# Patient Record
Sex: FEMALE | Race: WHITE | Employment: FULL TIME | URBAN - METROPOLITAN AREA
[De-identification: names, ages, dates, MRNs, and addresses within clinical notes are randomized per-mention and may not be internally consistent; named-entity substitution may affect disease eponyms.]

---

## 2019-03-19 ENCOUNTER — APPOINTMENT (OUTPATIENT)
Dept: PHYSICAL THERAPY | Age: 39
End: 2019-03-19
Payer: OTHER MISCELLANEOUS

## 2019-03-26 ENCOUNTER — APPOINTMENT (OUTPATIENT)
Dept: PHYSICAL THERAPY | Age: 39
End: 2019-03-26
Payer: OTHER MISCELLANEOUS

## 2019-04-01 ENCOUNTER — HOSPITAL ENCOUNTER (OUTPATIENT)
Dept: PHYSICAL THERAPY | Age: 39
Discharge: HOME | End: 2019-04-01
Payer: OTHER MISCELLANEOUS

## 2019-04-01 PROCEDURE — 97161 PT EVAL LOW COMPLEX 20 MIN: CPT

## 2019-04-01 PROCEDURE — 97530 THERAPEUTIC ACTIVITIES: CPT

## 2019-04-01 NOTE — PROGRESS NOTES
PHYSICAL THERAPY - DAILY TREATMENT NOTE Patient Name: Ama An        Date: 2019 : 1980   YES Patient  Verified Visit #:     Insurance: Payor: Ezequiel Hernandez / Plan: [employer] / Product Type: Commerical / In time: 2:00P Out time: 2:50P Total Treatment Time: 50 BCBS/Medicare Time Tracking (below) Total Timed Codes (min):  50 1:1 Treatment Time:  50 TREATMENT AREA =  Low back pain [M54.5] SUBJECTIVE Pain Level (on 0 to 10 scale):  4-6  / 10 Medication Changes/New allergies or changes in medical history, any new surgeries or procedures? NO    If yes, update Summary List  
Subjective Functional Status/Changes:  []  No changes reported See POC Modalities Rationale:     decrease inflammation and decrease pain to improve patient's ability to perform unlimited transfers 
 min [] Estim, type/location:   
                                 []  att     []  unatt     []  w/US     []  w/ice    []  w/heat 
 min []  Mechanical Traction: type/lbs   
               []  pro   []  sup   []  int   []  cont    []  before manual    []  after manual  
 min []  Ultrasound, settings/location:    
 min []  Iontophoresis w/ dexamethasone, location:   
                                           []  take home patch       []  in clinic  
10 min [x]  Ice     []  Heat    location/position: L/s in sitting   
 min []  Vasopneumatic Device, press/temp:   
 min []  Other:   
[] Skin assessment post-treatment (if applicable):   
[]  intact    []  redness- no adverse reaction    
[]redness  adverse reaction:     10 min Therapeutic Activity: [x]  See flow sheet; review of log roll, review of proper sitting posture, review of l/s and thoracic posture prior to standing from sit to stand to decrease overall pain.    
Rationale:    increase ROM, increase strength, improve coordination, improve balance and increase proprioception to improve the patients ability to perform unlimited transfers. Billed With/As: 
 [] TE 
 [] TA 
 [] Neuro 
 [] Self Care Patient Education: [x] Review HEP [] Progressed/Changed HEP based on:  
[] positioning   [] body mechanics   [] transfers   [] heat/ice application   
[] other:   
Other Objective/Functional Measures: 
 
Reviewed work ergonomics Post Treatment Pain Level (on 0 to 10) scale:   3-5  / 10 ASSESSMENT Assessment/Changes in Function:  
 
See POC []  See Progress Note/Recertification Patient will continue to benefit from skilled PT services to modify and progress therapeutic interventions, address functional mobility deficits, address ROM deficits, address strength deficits, analyze and address soft tissue restrictions, analyze and cue movement patterns, analyze and modify body mechanics/ergonomics, assess and modify postural abnormalities, address imbalance/dizziness and instruct in home and community integration to attain remaining goals. Progress toward goals / Updated goals: 
See POC PLAN [x]  Upgrade activities as tolerated YES Continue plan of care  
[]  Discharge due to :   
[]  Other:   
 
Therapist: Alexia Lopez Date: 4/1/2019 Time: 3:04 PM  
 
Future Appointments Date Time Provider Ava Burr 4/5/2019  2:30 PM [facility]  
4/9/2019  2:30 PM [facility]  
4/16/2019  2:00 PM [place] [facility] [place]  
4/18/2019  2:30 [place] [facility] [place]

## 2019-04-01 NOTE — PROGRESS NOTES
[address] [facility]  [facility] 
Kirk Iyer [facility] [address], [address] ,[address], [address]  Phone: [phone]  Fax: [phone] PLAN OF CARE / STATEMENT OF MEDICAL NECESSITY FOR PHYSICAL THERAPY SERVICES Patient Name: Noel Kramer : 1980 Medical  
Diagnosis: LBP Treatment Diagnosis: Low back pain [M54.5] Onset Date: 19 Referral Source: Edison Kingston MD [facility]): 2019 Prior Hospitalization: See medical history Provider #: [id number] Prior Level of Function: Unlimited sitting, squatting, standing, ambulation, and transfers without pain. Comorbidities: h/o concussion, Medications: Verified on Patient Summary List  
The Plan of Care and following information is based on the information from the initial evaluation.  
=========================================================================================== Assessment / key information:  PT is a 41year old female [facility] with a dx of T12 compression fracture on 19. She reports falling onto her L side when walking out of her work building and tripping on the sidewalk and fell onto her L side in a seated position. She was able to get up and walk to her car but then 2 hours later had trouble breathing and increased pain throughout her back. She reports radiographs were (+) for a compression fracture on T12 and she has since been wearing a TLSO since 19 and is weaning out of the brace per MD instructions given to her on 3/21/19. Pt takes pain medication 4x a week only at night. Patient reports pain is intermittent and made worse with movements. She reports having pin point pain that radiates throughout lumbar and thoracic paraspinals. Objective evaluation is as follows: 1) increased forward lean and lumbar/thoracic flexion in sitting resulting in painful sit to stand transfers with difficulty achieving full extension into standing.   Supine to sit transfers with increased pain. Poor tolerance to supine ~ 5 minutes until pain throughout lumbar/thoracic is increased. 2) Poor core strength in hooklying, hip abduction 3+/5 bilaterally, hip adduction 4-/5 bilaterally, hip flexion 4-/5, unable to perform double leg squat without pain. 3) TTP along lumbar and thoracic paraspinals 4) hip flexion AROM is limited to 90 degrees bilaterally, PROM is limited to 110 degrees bilaterally with end range tightness, notable decrease in hamstring muscle length. Pt signs and sx are consistent with LBP s/p T12 compression fracture. Pt would benefit from skilled PT services to increase strength, range of motion, balance, proprioception, coordination, core stability in order to improve ease with ADLs and functional mobility.    
=========================================================================================== Eval Complexity: History MEDIUM  Complexity : 1-2 comorbidities / personal factors will impact the outcome/ POC ;  Examination  MEDIUM Complexity : 3 Standardized tests and measures addressing body structure, function, activity limitation and / or participation in recreation ; Presentation LOW Complexity : Stable, uncomplicated ;  Decision Making HIGH Complexity : FOTO score of 1- 25 ; Overall Complexity LOW Problem List: pain affecting function, decrease ROM, decrease strength, edema affecting function, impaired gait/ balance, decrease ADL/ functional abilitiies, decrease activity tolerance, decrease flexibility/ joint mobility and decrease transfer abilities Treatment Plan may include any combination of the following: Therapeutic exercise, Therapeutic activities, Neuromuscular re-education, Physical agent/modality, Gait/balance training, Manual therapy, Aquatic therapy, Patient education, Self Care training, Functional mobility training, Home safety training and Stair training Patient / Family readiness to learn indicated by: asking questions, trying to perform skills and interest 
Persons(s) to be included in education: patient (P) Barriers to Learning/Limitations: None Measures taken, if barriers to learning:   
Patient Goal (s): \"I want to be able to function normally again. Get back to working out\" Patient self reported health status: fair Rehabilitation Potential: good ? Short Term Goals: To be accomplished in  4  weeks: 
1) etablish HEP in order to maintain gains made in physical therapy. 2) Patient to tolerate supine laying for > 10 minutes in order ot improve sleep quality. 3) Patient to have fair core recruitment in hooklying in order to allow for ease with transfers. 4) patient to make changes to work station to improve ergonomics to prevent further disability. ? Long Term Goals: To be accomplished in  6  weeks: 
1) Patient will be I and compliant with a progressive, high level HEP in order to maintain gains made in physical therapy. 2) Patient will demonstrate good core recruitment in sitting in order ot perform high level exercises. 3) Patient will be able to lift light objects from various heights with good body mechanics in order to prevent further disability. 4) Patient will increase FOTO score to >/= 47 in order to allow for ease with broom use. Frequency / Duration:   Patient to be seen  2-3  times per week for 6  weeks: 
Patient / Caregiver education and instruction: self care, activity modification, brace/ splint application and exercises Therapist Signature: Eamon Morris PT DPT  Date: 4/1/2019 Certification Period: NA Time: 1:57 PM  
=========================================================================================== I certify that the above Physical Therapy Services are being furnished while the patient is under my care. I agree with the treatment plan and certify that this therapy is necessary. Physician Signature:       Date:      Time:  Please sign and return to [facility] [employer] or you may fax the signed copy to [phone]. Thank you.

## 2019-04-05 ENCOUNTER — HOSPITAL ENCOUNTER (OUTPATIENT)
Dept: PHYSICAL THERAPY | Age: 39
Discharge: HOME | End: 2019-04-05
Payer: OTHER MISCELLANEOUS

## 2019-04-05 PROCEDURE — 97110 THERAPEUTIC EXERCISES: CPT

## 2019-04-05 NOTE — PROGRESS NOTES
PHYSICAL THERAPY - DAILY TREATMENT NOTE Patient Name: Aiden Reich        Date: 2019 : 1980   YES Patient  Verified Visit #:      of   12  Insurance: Payor: Salina Aaron / Plan: Diego Newberry / Product Type: Commerical / In time: 2;35P Out time: 3;20P Total Treatment Time: 45  
 
BCBS/Medicare Time Tracking (below) Total Timed Codes (min):  NA 1:1 Treatment Time:  NA  
TREATMENT AREA =  Low back pain [M54.5] SUBJECTIVE Pain Level (on 0 to 10 scale):  5  / 10 Medication Changes/New allergies or changes in medical history, any new surgeries or procedures? NO    If yes, update Summary List  
Subjective Functional Status/Changes:  []  No changes reported \"Today is a bad day\"  Pt reports increased pain throughout her back, possibly due to stressful day. Modalities Rationale:     decrease pain to improve patient's ability to perform unlimited transfers  
 min [] Estim, type/location:   
                                 []  att     []  unatt     []  w/US     []  w/ice    []  w/heat 
 min []  Mechanical Traction: type/lbs   
               []  pro   []  sup   []  int   []  cont    []  before manual    []  after manual  
 min []  Ultrasound, settings/location:    
 min []  Iontophoresis w/ dexamethasone, location:   
                                           []  take home patch       []  in clinic  
10 min [x]  Ice     []  Heat    location/position: Thoracic/lumbar spine ins sitting.   
 min []  Vasopneumatic Device, press/temp:   
 min []  Other:   
[] Skin assessment post-treatment (if applicable):   
[]  intact    []  redness- no adverse reaction    
[]redness  adverse reaction:     
35 min Therapeutic Exercise:  [x]  See flow sheet Rationale:      increase ROM, increase strength, improve coordination, improve balance and increase proprioception to improve the patients ability to perform unlimited ADls Billed With/As: 
 [] TE 
 [] TA 
 [] Neuro [] Self Care Patient Education: [x] Review HEP [] Progressed/Changed HEP based on:  
[] positioning   [] body mechanics   [] transfers   [] heat/ice application   
[] other:   
Other Objective/Functional Measures: 
 
Limited tolerance to supine laying today 
initated therex per flow sheet Educated patient on use of cushioned insole to decrease shock/pain throughout l/s and t/s during ambulation when wearing flat/dressy shoes. Post Treatment Pain Level (on 0 to 10) scale:   5  / 10 ASSESSMENT Assessment/Changes in Function:  
 
Patient is making gradual progress with PT rx, slight improvement in tolerance to supine laying today but limited to 3 exercises. []  See Progress Note/Recertification Patient will continue to benefit from skilled PT services to modify and progress therapeutic interventions, address functional mobility deficits, address ROM deficits, address strength deficits, analyze and address soft tissue restrictions, analyze and cue movement patterns, analyze and modify body mechanics/ergonomics, assess and modify postural abnormalities, address imbalance/dizziness and instruct in home and community integration to attain remaining goals. Progress toward goals / Updated goals: 
Progressing towards LTG 2. PLAN [x]  Upgrade activities as tolerated YES Continue plan of care  
[]  Discharge due to :   
[]  Other:   
 
Therapist: William Mcdonnell Date: 4/5/2019 Time: 3:58 PM  
 
Future Appointments Date Time Provider Ava Burr 4/9/2019  2:30 PM [facility]  
4/16/2019  2:00 PM Mariaa Mcqueen PT [facility] [place]  
4/18/2019  2:30 [place] [facility] [place]

## 2019-04-09 ENCOUNTER — HOSPITAL ENCOUNTER (OUTPATIENT)
Dept: PHYSICAL THERAPY | Age: 39
Discharge: HOME | End: 2019-04-09
Payer: OTHER MISCELLANEOUS

## 2019-04-09 PROCEDURE — 97110 THERAPEUTIC EXERCISES: CPT

## 2019-04-09 NOTE — PROGRESS NOTES
PHYSICAL THERAPY - DAILY TREATMENT NOTE Patient Name: Shira Barton        Date: 2019 :    YES Patient  Verified Visit #:   3   of   12  Insurance: Payor: Gonzales Gunn / Plan: Garrison Gardner / Product Type: Commerical / In time: 2:20P Out time: 3:20P Total Treatment Time: 60  
 
BCBS/Medicare Time Tracking (below) Total Timed Codes (min):  NA 1:1 Treatment Time:  NA  
TREATMENT AREA =  Low back pain [M54.5] SUBJECTIVE Pain Level (on 0 to 10 scale):  4.5  / 10 Medication Changes/New allergies or changes in medical history, any new surgeries or procedures? NO    If yes, update Summary List  
Subjective Functional Status/Changes:  []  No changes reported \"I tried sheos with a insole but they were uncomfortable on my feet\"  Patient reports f/u with MD and she reports that her back is still not healing and that she continues to be on restrictions at work and does not need to wear her brace anymore. Modalities Rationale:     decrease pain to improve patient's ability to perform pain free transfers 
 min [] Estim, type/location:   
                                 []  att     []  unatt     []  w/US     []  w/ice    []  w/heat 
 min []  Mechanical Traction: type/lbs   
               []  pro   []  sup   []  int   []  cont    []  before manual    []  after manual  
 min []  Ultrasound, settings/location:    
 min []  Iontophoresis w/ dexamethasone, location:   
                                           []  take home patch       []  in clinic  
10 min [x]  Ice     []  Heat    location/position: L/s in sitting   
 min []  Vasopneumatic Device, press/temp:   
 min []  Other:   
[] Skin assessment post-treatment (if applicable):   
[]  intact    []  redness- no adverse reaction    
[]redness  adverse reaction:     
50 min Therapeutic Exercise:  [x]  See flow sheet Rationale:      increase ROM, increase strength, improve coordination, improve balance and increase proprioception to improve the patients ability to perform unlimited ADLs Billed With/As: 
 [] TE 
 [] TA 
 [] Neuro 
 [] Self Care Patient Education: [x] Review HEP [] Progressed/Changed HEP based on:  
[] positioning   [] body mechanics   [] transfers   [] heat/ice application   
[] other:   
Other Objective/Functional Measures: Added SB iso, clamshell. Improved gait mechanics and tolerance to supine laying today. Poor core recruitment during SB march Post Treatment Pain Level (on 0 to 10) scale:   2  / 10 ASSESSMENT Assessment/Changes in Function:  
 
Patient is making gradual progress with PT rx but continues to require max cuing throughout core exercise. []  See Progress Note/Recertification Patient will continue to benefit from skilled PT services to modify and progress therapeutic interventions, address functional mobility deficits, address ROM deficits, address strength deficits, analyze and address soft tissue restrictions, analyze and cue movement patterns, analyze and modify body mechanics/ergonomics, assess and modify postural abnormalities, address imbalance/dizziness and instruct in home and community integration to attain remaining goals. Progress toward goals / Updated goals: 
Progressing towards STG 2. PLAN [x]  Upgrade activities as tolerated YES Continue plan of care  
[]  Discharge due to :   
[]  Other:   
 
Therapist: Alexia Lopez Date: 4/9/2019 Time: 2:25 PM  
 
Future Appointments Date Time Provider Ava Burr 4/9/2019  2:30 PM Adam Ronquillo [facility]  
4/16/2019  2:00 PM [place] [facility] [place]  
4/18/2019  2:30 PM [place] [facility] [place]

## 2019-04-12 ENCOUNTER — APPOINTMENT (OUTPATIENT)
Dept: PHYSICAL THERAPY | Age: 39
End: 2019-04-12
Payer: OTHER MISCELLANEOUS

## 2019-04-16 ENCOUNTER — HOSPITAL ENCOUNTER (OUTPATIENT)
Dept: PHYSICAL THERAPY | Age: 39
Discharge: HOME | End: 2019-04-16
Payer: OTHER MISCELLANEOUS

## 2019-04-16 PROCEDURE — 97110 THERAPEUTIC EXERCISES: CPT

## 2019-04-16 NOTE — PROGRESS NOTES
PHYSICAL THERAPY - DAILY TREATMENT NOTE Patient Name: Gabriella Atkins        Date: 2019 : 1980   YES Patient  Verified Visit #:    Insurance: Payor: Johanne Jaffe / Plan: Estevan Spencer / Product Type: Commerical / In time: NA Out time: NA Total Treatment Time: NA Medicare Time Tracking (below) Total Timed Codes (min):   1:1 Treatment Time:    
TREATMENT AREA =  Low back pain [M54.5] SUBJECTIVE Pain Level (on 0 to 10 scale):    / 10 Medication Changes/New allergies or changes in medical history, any new surgeries or procedures? NO    If yes, update Summary List  
Subjective Functional Status/Changes:  []  No changes reported See paper chart (CC system down on this date) Modalities Rationale:    
 min [] Estim, type/location:   
                                 []  att     []  unatt     []  w/US     []  w/ice    []  w/heat 
 min []  Mechanical Traction: type/lbs   
               []  pro   []  sup   []  int   []  cont    []  before manual    []  after manual  
 min []  Ultrasound, settings/location:    
 min []  Iontophoresis w/ dexamethasone, location:   
                                           []  take home patch       []  in clinic  
 min []  Ice     []  Heat    location/position:   
 min []  Vasopneumatic Device, press/temp:   
 min []  Other:   
[] Skin assessment post-treatment (if applicable):   
[]  intact    []  redness- no adverse reaction    
[]redness  adverse reaction:     
 min Therapeutic Exercise:  [x]  See flow sheet Rationale:     
 min Manual Therapy:   
Rationale:      min Therapeutic Activity: [x]  See flow sheet Rationale:    
 
 min Neuromuscular Re-ed: [x]  See flow sheet Rationale:     
 
 min Gait Training:  ___ feet with ___ device on level surfaces with ___ level of assistance Rationale:  To improve ambulation safety and efficiency in order to improve patient's ability to safely ambulate at home for self care. min Self Care:   
Rationale:    
Billed With/As: 
 [] TE 
 [] TA 
 [] Neuro 
 [] Self Care Patient Education: [x] Review HEP [] Progressed/Changed HEP based on:  
[] positioning   [] body mechanics   [] transfers   [] heat/ice application   
[] other:   
 
Other Objective/Functional Measures: 
 
See paper chart (CC system down on this date) Post Treatment Pain Level (on 0 to 10) scale:     / 10 ASSESSMENT Assessment/Changes in Function:  
 
See paper chart (CC system down on this date) []  See Progress Note/Recertification Patient will continue to benefit from skilled PT services to modify and progress therapeutic interventions to attain remaining goals. Progress toward goals / Updated goals: 
See paper chart (CC system down on this date) PLAN [x]  Upgrade activities as tolerated YES Continue plan of care  
[]  Discharge due to :   
[]  Other:   
 
Therapist: Kit Lozano, PT Date: 4/16/2019 Time: 6:05 PM  
 
Future Appointments Date Time Provider Aav Burr 4/18/2019  2:30 PM [facility]

## 2019-04-18 ENCOUNTER — HOSPITAL ENCOUNTER (OUTPATIENT)
Dept: PHYSICAL THERAPY | Age: 39
Discharge: HOME | End: 2019-04-18
Payer: OTHER MISCELLANEOUS

## 2019-04-18 PROCEDURE — 97110 THERAPEUTIC EXERCISES: CPT

## 2019-04-18 NOTE — PROGRESS NOTES
PHYSICAL THERAPY - DAILY TREATMENT NOTE Patient Name: Ama An        Date: 2019 : 1980   YES Patient  Verified Visit #:      of   12  Insurance: Payor: Ezequiel Hernandez / Plan: [employer] / Product Type: Commerical / In time: 2:30P Out time: 3:27P Total Treatment Time: 62 BCBS/Medicare Time Tracking (below) Total Timed Codes (min):  na 1:1 Treatment Time:  na  
TREATMENT AREA =  Low back pain [M54.5] SUBJECTIVE Pain Level (on 0 to 10 scale):  3  / 10 Medication Changes/New allergies or changes in medical history, any new surgeries or procedures? NO    If yes, update Summary List  
Subjective Functional Status/Changes:  []  No changes reported Pt repots going to [place] this past weekend with no increase in overall pain. She reports improvements in overall sleep quality requiring less positioning with pillows. Modalities Rationale:     decrease inflammation and decrease pain to improve patient's ability to perform unlimited ADLs 
 min [] Estim, type/location:   
                                 []  att     []  unatt     []  w/US     []  w/ice    []  w/heat 
 min []  Mechanical Traction: type/lbs   
               []  pro   []  sup   []  int   []  cont    []  before manual    []  after manual  
 min []  Ultrasound, settings/location:    
 min []  Iontophoresis w/ dexamethasone, location:   
                                           []  take home patch       []  in clinic  
10 min [x]  Ice     []  Heat    location/position: Thoracic spine in sitting.   
 min []  Vasopneumatic Device, press/temp:   
 min []  Other:   
[] Skin assessment post-treatment (if applicable):   
[]  intact    []  redness- no adverse reaction    
[]redness  adverse reaction:     
47 min Therapeutic Exercise:  [x]  See flow sheet Rationale:      increase ROM, increase strength, improve coordination, improve balance and increase proprioception to improve the patients ability to perform unlimited ADLs Billed With/As: 
 [] TE 
 [] TA 
 [] Neuro 
 [] Self Care Patient Education: [x] Review HEP [] Progressed/Changed HEP based on:  
[] positioning   [] body mechanics   [] transfers   [] heat/ice application   
[] other:   
Other Objective/Functional Measures: 
 
Notable improvement in ability to tolerate supine and no visual signs of pain during bed mobility today. Able to perform quadruped TA draw and h/l march with TA draw today Post Treatment Pain Level (on 0 to 10) scale:   1  / 10 ASSESSMENT Assessment/Changes in Function: No overall increase in pain with therex today, requires moderate cuing throughout all core exercises for TA activation. []  See Progress Note/Recertification Patient will continue to benefit from skilled PT services to modify and progress therapeutic interventions, address functional mobility deficits, address ROM deficits, address strength deficits, analyze and address soft tissue restrictions, analyze and cue movement patterns, analyze and modify body mechanics/ergonomics, assess and modify postural abnormalities, address imbalance/dizziness and instruct in home and community integration to attain remaining goals. Progress toward goals / Updated goals: 
Progressing towards STG 2. PLAN [x]  Upgrade activities as tolerated YES Continue plan of care  
[]  Discharge due to :   
[]  Other:   
 
Therapist: Daniel Levy Date: 4/18/2019 Time: 2:39 PM  
 
Future Appointments Date Time Provider Ava Burr 4/23/2019  2:30 PM Jenaro Chapin [place] [id number]  
4/25/2019  2:30 PM Jenaro Chapin [place] [id number]  
5/2/2019  2:00 PM Jenaro Chapin [place] [id number]  
5/3/2019  2:00 PM  [address]

## 2019-04-23 ENCOUNTER — APPOINTMENT (OUTPATIENT)
Dept: PHYSICAL THERAPY | Age: 39
End: 2019-04-23
Payer: OTHER MISCELLANEOUS

## 2019-04-25 ENCOUNTER — APPOINTMENT (OUTPATIENT)
Dept: PHYSICAL THERAPY | Age: 39
End: 2019-04-25
Payer: OTHER MISCELLANEOUS

## 2019-05-02 ENCOUNTER — HOSPITAL ENCOUNTER (OUTPATIENT)
Dept: PHYSICAL THERAPY | Age: 39
Discharge: HOME | End: 2019-05-02
Payer: OTHER MISCELLANEOUS

## 2019-05-02 PROCEDURE — 97110 THERAPEUTIC EXERCISES: CPT

## 2019-05-02 NOTE — PROGRESS NOTES
[address]  [facility] PHYSICAL THERAPY AT [address] [place] [address] [facility], [address] [place] [address], [address]  Phone: [phone]  Fax: [phone] PROGRESS NOTE Patient Name: Frederick Bustillos : 1980 Treatment/Medical Diagnosis: Low back pain [M54.5] Referral Source: Donald Loaiza MD    
Date of Initial Visit: 19 Attended Visits: 6 Missed Visits: 2 SUMMARY OF TREATMENT Therapeutic exercise to increase strength, range of motion, balance, proprioception, coordination, core stability, and back education. Modalities as needed for pain control. CURRENT STATUS Ms. Vega is making gradual progress with physical therapy. She reports feeling approximately 20% improvement overall in her sx. Mopping, sweeping, unable to lift her son > 52 pounds, only able to sit for approximately 1 hour until pain begins. Pain ranges from a 2-4/10. She reports occasional use of TLSO when performing house work. She has made some modifications to her work station including use of a lumbar roll. She currently is able to sleep throughout the night with waking 1-2x per night. She has stopped taking all pain medication but will take ibuprofen at night time for sleeping. Goal/Measure of Progress Goal Met? 1.  etablish HEP in order to maintain gains made in physical therapy. Status at last Eval: - Current Status: Established  yes 2. Patient to tolerate supine laying for > 10 minutes in order ot improve sleep quality. Status at last Eval: Unable to tolerate 5 minutes in supine Current Status: Tolerates 16 minutes of supine therex. yes 3. Patient to have fair core recruitment in hooklying in order to allow for ease with transfers. Status at last Eval: poor Current Status: fair yes 4.  patient to make changes to work station to improve ergonomics to prevent further disability.   
Status at last Eval: Reports sitting with forward flexed trunk for comfort Current Status: Come compliance with use of lumbar roll yes New Goals to be achieved in __4__  weeks: 1. Patient will be I and compliant with a progressive, high level HEP in order to maintain gains made in physical therapy. 2.  Patient will demonstrate good core recruitment in sitting in order ot perform high level exercises. 3.  Patient will be able to lift light objects from various heights with good body mechanics in order to prevent further disability. 4.  Patient will increase FOTO score to >/= 47 in order to allow for ease with broom use. RECOMMENDATIONS Continue PT rx 2x a week for 4 weeks to work towards LTG. If you have any questions/comments please contact us directly at [phone]. Thank you for allowing us to assist in the care of your patient. Therapist Signature: Lucina Soares Date: 5/2/2019 Time: 2:00 PM  
NOTE TO PHYSICIAN:  PLEASE COMPLETE THE ORDERS BELOW AND FAX TO [facility] Physical Therapy: [phone]. If you are unable to process this request in 24 hours please contact our office: [phone]. 
 
___ I have read the above report and request that my patient continue as recommended.  
___ I have read the above report and request that my patient continue therapy with the following changes/special instructions:_________________________________________________________  
___ I have read the above report and request that my patient be discharged from therapy.   
 
Physician Signature:       Date:      Time:

## 2019-05-02 NOTE — PROGRESS NOTES
PHYSICAL THERAPY - DAILY TREATMENT NOTE Patient Name: Desean Reed        Date: 2019 : 1980   YES Patient  Verified Visit #:     Insurance: Payor: Ez Mcmillan / Plan: Mirta Isaacs / Product Type: Commerical / In time: 2:00P Out time: 2:55P Total Treatment Time: 55  
 
BCBS/Medicare Time Tracking (below) Total Timed Codes (min):  NA 1:1 Treatment Time:  NA  
TREATMENT AREA =  Low back pain [M54.5] SUBJECTIVE Pain Level (on 0 to 10 scale):  2  / 10 Medication Changes/New allergies or changes in medical history, any new surgeries or procedures? NO    If yes, update Summary List  
Subjective Functional Status/Changes:  []  No changes reported \"I am so frustated I haven't felt any better with my pain\" Modalities Rationale:     decrease inflammation and decrease pain to improve patient's ability to perform 
 min [] Estim, type/location:   
                                 []  att     []  unatt     []  w/US     []  w/ice    []  w/heat 
 min []  Mechanical Traction: type/lbs   
               []  pro   []  sup   []  int   []  cont    []  before manual    []  after manual  
 min []  Ultrasound, settings/location:    
 min []  Iontophoresis w/ dexamethasone, location:   
                                           []  take home patch       []  in clinic  
10 min [x]  Ice     []  Heat    location/position: T/s in supine  
 min []  Vasopneumatic Device, press/temp:   
 min []  Other:   
[] Skin assessment post-treatment (if applicable):   
[]  intact    []  redness- no adverse reaction    
[]redness  adverse reaction:     
45 min Therapeutic Exercise:  [x]  See flow sheet Rationale:      increase ROM, increase strength, improve coordination, improve balance and increase proprioception to improve the patients ability to perform unlimited ADLs Billed With/As: 
 [] TE 
 [] TA 
 [] Neuro 
 [] Self Care Patient Education: [x] Review HEP   
 [] Progressed/Changed HEP based on:  
[] positioning   [] body mechanics   [] transfers   [] heat/ice application   
[] other:   
Other Objective/Functional Measures: 
 
See PN Post Treatment Pain Level (on 0 to 10) scale:   2  / 10 ASSESSMENT Assessment/Changes in Function:  
 
See PN  
  
[]  See Progress Note/Recertification Patient will continue to benefit from skilled PT services to modify and progress therapeutic interventions, address functional mobility deficits, address ROM deficits, address strength deficits, analyze and address soft tissue restrictions, analyze and cue movement patterns, analyze and modify body mechanics/ergonomics, assess and modify postural abnormalities, address imbalance/dizziness and instruct in home and community integration to attain remaining goals. Progress toward goals / Updated goals: 
See PN   
 
PLAN [x]  Upgrade activities as tolerated YES Continue plan of care  
[]  Discharge due to :   
[]  Other:   
 
Therapist: Rashmi Farias Date: 5/2/2019 Time: 2:25 PM  
 
Future Appointments Date Time Provider Ava Burr 5/3/2019  2:00 PM [facility]

## 2019-05-07 ENCOUNTER — HOSPITAL ENCOUNTER (OUTPATIENT)
Dept: PHYSICAL THERAPY | Age: 39
Discharge: HOME | End: 2019-05-07
Payer: OTHER MISCELLANEOUS

## 2019-05-07 PROCEDURE — 97110 THERAPEUTIC EXERCISES: CPT

## 2019-05-07 NOTE — PROGRESS NOTES
PHYSICAL THERAPY - DAILY TREATMENT NOTE Patient Name: Kavita Razo        Date: 2019 : 1980   YES Patient  Verified Visit #:     Insurance: Payor: Bimal Bloom / Plan: Javier Healy / Product Type: Commerical / In time: 2 P Out time: 2:50 P Total Treatment Time: 50 BCBS/Medicare Time Tracking (below) Total Timed Codes (min):  NA 1:1 Treatment Time:  NA  
TREATMENT AREA = Low back pain [M54.5] SUBJECTIVE Pain Level (on 0 to 10 scale):  2  / 10 Medication Changes/New allergies or changes in medical history, any new surgeries or procedures? NO    If yes, update Summary List  
Subjective Functional Status/Changes:  []  No changes reported Patient reports since last Friday, she has started to have pain again with sneezing or anything that causes pressure, like laughing or coughing but she is also more active & more on her feet. She feels like she has plateaued with progress, she is no better & no worse, she is taking ibuprofen to sleep at night. Constant pain with average pain at 2-3/10, 7-8/10 at worst typically with bending forward or sitting too long or if she has stress at work. 20% improvement overall. She only uses back brace when she does housework & still waiting on rising work station at work. Modalities Rationale:     decrease pain to improve patient's ability to return to pain-free work activities 
 min [] Estim, type/location:   
                                 []  att     []  unatt     []  w/US     []  w/ice    []  w/heat 
 min []  Mechanical Traction: type/lbs   
               []  pro   []  sup   []  int   []  cont    []  before manual    []  after manual  
 min []  Ultrasound, settings/location:    
 min []  Iontophoresis w/ dexamethasone, location:   
                                           []  take home patch       []  in clinic  
10 min [x]  Ice     []  Heat    location/position: Seated to t/s min []  Vasopneumatic Device, press/temp:   
 min []  Other:   
[] Skin assessment post-treatment (if applicable):   
[]  intact    []  redness- no adverse reaction    
[]redness  adverse reaction:     
40 min Therapeutic Exercise:  [x]  See flow sheet Rationale:      increase ROM and increase strength to improve the patients ability to return to light lifting Billed With/As: 
 [] TE 
 [] TA 
 [] Neuro 
 [] Self Care Patient Education: [x] Review HEP [] Progressed/Changed HEP based on:  
[] positioning   [] body mechanics   [] transfers   [] heat/ice application   
[] other:   
Other Objective/Functional Measures: 
 
Resumed therapeutic exercise per flow sheet Post Treatment Pain Level (on 0 to 10) scale:   1  / 10 ASSESSMENT Assessment/Changes in Function:  
 
Slow progress with sx reduction, although improved tolerance to today's treatment, no c/o pain with mini squats or standing ex today 
  
[]  See Progress Note/Recertification Patient will continue to benefit from skilled PT services to modify and progress therapeutic interventions, address functional mobility deficits, address ROM deficits, address strength deficits, assess and modify postural abnormalities and instruct in home and community integration to attain remaining goals. Progress toward goals / Updated goals: 
Progressing towards LTG 1, 2 PLAN 
[]  Upgrade activities as tolerated YES Continue plan of care  
[]  Discharge due to :   
[]  Other:   
 
Therapist: Leonidas Abbott PT Date: 5/7/2019 Time: 2:20 PM  
 
Future Appointments Date Time Provider Ava Burr 5/9/2019  1:00 PM [facility], [facility]  
5/14/2019  [facility]  
5/16/2019  2:00 [facility]  
5/21/2019  2:30 PM Mandeep Carrasquillo [facility]  
5/23/2019  2:30 PM Ling Jacobsen, [facility]

## 2019-05-09 ENCOUNTER — HOSPITAL ENCOUNTER (OUTPATIENT)
Dept: PHYSICAL THERAPY | Age: 39
Discharge: HOME | End: 2019-05-09
Payer: OTHER MISCELLANEOUS

## 2019-05-09 PROCEDURE — 97110 THERAPEUTIC EXERCISES: CPT | Performed by: PHYSICAL THERAPIST

## 2019-05-09 NOTE — PROGRESS NOTES
PHYSICAL THERAPY - DAILY TREATMENT NOTE Patient Name: Carrie Prader        Date: 2019 : 1980   YES Patient  Verified Visit #:   8   of   12  Insurance: Payor: Rito Canales / Plan: Yue Profit / Product Type: Commerical / In time: 1:20 Out time: 2:10 Total Treatment Time: 50 BCBS/Medicare Time Tracking (below) Total Timed Codes (min):  na 1:1 Treatment Time:  na  
TREATMENT AREA =  Low back pain [M54.5] SUBJECTIVE Pain Level (on 0 to 10 scale):  2  / 10 Medication Changes/New allergies or changes in medical history, any new surgeries or procedures? NO    If yes, update Summary List  
Subjective Functional Status/Changes:  []  No changes reported Pt reports she has been managing pain to ~2/10 regularly, but uses NSAIDs regularly. Modalities Rationale:     decrease pain and increase tissue extensibility to improve patient's ability to increase posiitonal/activty tolerance 
 min [] Estim, type/location:   
                                 []  att     []  unatt     []  w/US     []  w/ice    []  w/heat 
 min []  Mechanical Traction: type/lbs   
               []  pro   []  sup   []  int   []  cont    []  before manual    []  after manual  
 min []  Ultrasound, settings/location:    
 min []  Iontophoresis w/ dexamethasone, location:   
                                           []  take home patch       []  in clinic  
10 min []  Ice     [x]  Heat    location/position: t-spine - supine after session  
 min []  Vasopneumatic Device, press/temp:   
 min []  Other:   
[] Skin assessment post-treatment (if applicable):   
[]  intact    []  redness- no adverse reaction    
[]redness  adverse reaction:     
40 min Therapeutic Exercise:  [x]  See flow sheet Rationale:      increase ROM, increase strength and improve coordination to improve the patients ability to lift light weights wihtout symptoms Billed With/As: 
 [] TE 
 [] TA 
 [] Neuro [] Self Care Patient Education: [x] Review HEP [] Progressed/Changed HEP based on:  
[] positioning   [] body mechanics   [] transfers   [] heat/ice application   
[] other:   
Other Objective/Functional Measures: Added lat pulldowns today, with emphasis on pt using LEs to push her back into erect postrue back into the seat back Post Treatment Pain Level (on 0 to 10) scale:   1  / 10 ASSESSMENT Assessment/Changes in Function:  
 
Pt encouraged to try and use erect posture (supine or leaning back in chair) more frequently to increase tolerance. []  See Progress Note/Recertification Patient will continue to benefit from skilled PT services to modify and progress therapeutic interventions, address functional mobility deficits, address ROM deficits, address strength deficits, analyze and address soft tissue restrictions, analyze and cue movement patterns, analyze and modify body mechanics/ergonomics and assess and modify postural abnormalities to attain remaining goals. Progress toward goals / Updated goals: 
Pt showing good tolerance to extension biased positioning. PLAN [x]  Upgrade activities as tolerated YES Continue plan of care  
[]  Discharge due to :   
[]  Other:   
 
Therapist: Lionel Mendiola PT Date: 5/9/2019 Time: 9:18 AM  
 
Future Appointments Date Time Provider Ava Burr 5/9/2019  1:00 PM Mohan Holly PT [facility]  
5/14/2019  1:00 PM Mohan Holly PT [facility]  
5/16/2019  2:00 PM Mohan Holly PT [facility]  
5/21/2019  2:30 PM Parisa Jamil [facility]  
5/23/2019  2:30 PM Lakeisha Jacobsen, PT [facility]

## 2019-05-14 ENCOUNTER — HOSPITAL ENCOUNTER (OUTPATIENT)
Dept: PHYSICAL THERAPY | Age: 39
Discharge: HOME | End: 2019-05-14
Payer: OTHER MISCELLANEOUS

## 2019-05-14 PROCEDURE — 97110 THERAPEUTIC EXERCISES: CPT | Performed by: PHYSICAL THERAPIST

## 2019-05-14 NOTE — PROGRESS NOTES
PHYSICAL THERAPY - DAILY TREATMENT NOTE Patient Name: Abhishek Maya        Date: 2019 : 1980   YES Patient  Verified Visit #:     Insurance: Payor: Glenis Edward / Plan: Marvin Askew / Product Type: Commerical / In time: 1:15 Out time: 2:10 Total Treatment Time: 55  
 
BCBS/Medicare Time Tracking (below) Total Timed Codes (min):  na 1:1 Treatment Time:  na  
TREATMENT AREA =  Low back pain [M54.5] SUBJECTIVE Pain Level (on 0 to 10 scale):  2-3  / 10 Medication Changes/New allergies or changes in medical history, any new surgeries or procedures? NO    If yes, update Summary List  
Subjective Functional Status/Changes:  []  No changes reported Pt reports seeing MD and was given order to progress with ADLs of bending and lifting over the next 4 sessions. Modalities Rationale:     decrease pain and increase tissue extensibility to improve patient's ability to increase posiitonal/activty tolerance 
 min [] Estim, type/location:   
                                 []  att     []  unatt     []  w/US     []  w/ice    []  w/heat 
 min []  Mechanical Traction: type/lbs   
               []  pro   []  sup   []  int   []  cont    []  before manual    []  after manual  
 min []  Ultrasound, settings/location:    
 min []  Iontophoresis w/ dexamethasone, location:   
                                           []  take home patch       []  in clinic  
10 min []  Ice     [x]  Heat    location/position: t-spine - supine after session  
 min []  Vasopneumatic Device, press/temp:   
 min []  Other:   
[] Skin assessment post-treatment (if applicable):   
[]  intact    []  redness- no adverse reaction    
[]redness  adverse reaction:     
45 min Therapeutic Exercise:  [x]  See flow sheet Rationale:      increase ROM, increase strength and improve coordination to improve the patients ability to lift light weights wihtout symptoms Billed With/As: 
 [x] TE 
 [] TA 
 [] Neuro 
 [] Self Care Patient Education: [x] Review HEP [] Progressed/Changed HEP based on:  
[x] positioning   [] body mechanics   [] transfers   [] heat/ice application   
[] other:   
Other Objective/Functional Measures: Added hip hinge with dowel, golfer's lift and prone on elbows positioning Post Treatment Pain Level (on 0 to 10) scale:   1  / 10 ASSESSMENT Assessment/Changes in Function:  
 
Pt had difficulty with first getting into prone position, but it became more comfortable over time. []  See Progress Note/Recertification Patient will continue to benefit from skilled PT services to modify and progress therapeutic interventions, address functional mobility deficits, address ROM deficits, address strength deficits, analyze and address soft tissue restrictions, analyze and cue movement patterns, analyze and modify body mechanics/ergonomics and assess and modify postural abnormalities to attain remaining goals. Progress toward goals / Updated goals: Will continue to progress with increasing funtcional activity tolerance PLAN [x]  Upgrade activities as tolerated YES Continue plan of care  
[]  Discharge due to :   
[]  Other:   
 
Therapist: Catracho Zambrano PT Date: 5/14/2019 Time: 9:18 AM  
 
Future Appointments Date Time Provider Ava Burr 5/14/2019  1:00 PM Deann Kerr PT [facility]  
5/16/2019  2:00 PM Deann Kerr PT [facility]  
5/21/2019  2:30 PM Viky Schmidt [facility]  
5/23/2019  2:30 PM Dayday Jacobsen, PT [facility]

## 2019-05-16 ENCOUNTER — HOSPITAL ENCOUNTER (OUTPATIENT)
Dept: PHYSICAL THERAPY | Age: 39
Discharge: HOME | End: 2019-05-16
Payer: OTHER MISCELLANEOUS

## 2019-05-16 PROCEDURE — 97110 THERAPEUTIC EXERCISES: CPT | Performed by: PHYSICAL THERAPIST

## 2019-05-16 NOTE — PROGRESS NOTES
PHYSICAL THERAPY - DAILY TREATMENT NOTE Patient Name: Bre Krause        Date: 2019 : 1980   YES Patient  Verified Visit #:   10   of   12  Insurance: Payor: Danette Stroud / Plan: Bozena Barron / Product Type: Commerical / In time: 1:35 Out time: 2:15 Total Treatment Time: 40  
 
BCBS/Medicare Time Tracking (below) Total Timed Codes (min):  na 1:1 Treatment Time:  na  
TREATMENT AREA =  Low back pain [M54.5] SUBJECTIVE Pain Level (on 0 to 10 scale):  1  / 10 Medication Changes/New allergies or changes in medical history, any new surgeries or procedures? NO    If yes, update Summary List  
Subjective Functional Status/Changes:  []  No changes reported Pt reports able to lie down prone at home with only mild soreness. Modalities Rationale:     decrease pain and increase tissue extensibility to improve patient's ability to increase posiitonal/activty tolerance 
 min [] Estim, type/location:   
                                 []  att     []  unatt     []  w/US     []  w/ice    []  w/heat 
 min []  Mechanical Traction: type/lbs   
               []  pro   []  sup   []  int   []  cont    []  before manual    []  after manual  
 min []  Ultrasound, settings/location:    
 min []  Iontophoresis w/ dexamethasone, location:   
                                           []  take home patch       []  in clinic HEP min []  Ice     [x]  Heat    location/position: t-spine - supine after session  
 min []  Vasopneumatic Device, press/temp:   
 min []  Other:   
[] Skin assessment post-treatment (if applicable):   
[]  intact    []  redness- no adverse reaction    
[]redness  adverse reaction:     
40 min Therapeutic Exercise:  [x]  See flow sheet Rationale:      increase ROM, increase strength and improve coordination to improve the patients ability to lift light weights wihtout symptoms Billed With/As: 
 [x] TE 
 [] TA 
 [] Neuro [] Self Care Patient Education: [x] Review HEP [] Progressed/Changed HEP based on:  
[x] positioning   [] body mechanics   [] transfers   [] heat/ice application   
[] other:   
Other Objective/Functional Measures: Added seated ball march after prone stretching Post Treatment Pain Level (on 0 to 10) scale:   1  / 10 ASSESSMENT Assessment/Changes in Function:  
 
Pt showing improved tolerance to spinal extension and was able to practice picking up a 6lb object from knee level. []  See Progress Note/Recertification Patient will continue to benefit from skilled PT services to modify and progress therapeutic interventions, address functional mobility deficits, address ROM deficits, address strength deficits, analyze and address soft tissue restrictions, analyze and cue movement patterns, analyze and modify body mechanics/ergonomics and assess and modify postural abnormalities to attain remaining goals. Progress toward goals / Updated goals: 
Pt is showing improved positional tolerance and core strength. PLAN [x]  Upgrade activities as tolerated YES Continue plan of care  
[]  Discharge due to :   
[]  Other:   
 
Therapist: Jonah Maguire PT Date: 5/16/2019 Time: 9:18 AM  
 
Future Appointments Date Time Provider Ava Burr 5/16/2019  2:00 PM Gabrielle De La Garza, PT [facility]  
5/21/2019  2:30 PM Son Zhao [facility]  
5/23/2019  2:30 PM Larissa Jacobsen, PT [facility]

## 2019-06-12 NOTE — PROGRESS NOTES
[address]  [facility]YSICAL [facility]AP[facility]  [facility]  Kirk Iyer \[facility]\"" 96, [address] [place] [address], [address]  Phone: [phone]  Fax: [phone] SUMMARY  Patient Name: Ramos Flannery : 1980   Treatment/Medical Diagnosis: Low back pain [M54.5]   Referral Source: Nicho Rivero MD     Date of Initial Visit: 19 Attended Visits: 10 Missed Visits: 2     SUMMARY OF TREATMENT  Therapeutic exercise to increase strength, range of motion, balance, proprioception, coordination, core stability, and back education. Modalities as needed for pain control. CURRENT STATUS  Pt was last seen on 19, and reported pain of 1/10, her lowest since starting PT. She had been working on lying prone and doing body mechnaics training for bending/lifting without increased symptoms. Pt did not return after this session and did not show for her final 2 scheduled appts. She will be discharged from PT with her current status unknown. RECOMMENDATIONS  Discontinue therapy due to lack of attendance or compliance. If you have any questions/comments please contact us directly at [phone]. Thank you for allowing us to assist in the care of your patient. Therapist Signature:  Patricia Joseph PT Date: 19     Time: 3:33 PM